# Patient Record
Sex: MALE | Race: WHITE | Employment: OTHER | URBAN - METROPOLITAN AREA
[De-identification: names, ages, dates, MRNs, and addresses within clinical notes are randomized per-mention and may not be internally consistent; named-entity substitution may affect disease eponyms.]

---

## 2020-02-21 ENCOUNTER — THERAPY VISIT (OUTPATIENT)
Dept: PHYSICAL THERAPY | Facility: CLINIC | Age: 50
End: 2020-02-21
Payer: COMMERCIAL

## 2020-02-21 DIAGNOSIS — S46.012A TRAUMATIC INCOMPLETE TEAR OF LEFT ROTATOR CUFF, INITIAL ENCOUNTER: ICD-10-CM

## 2020-02-21 PROCEDURE — 97161 PT EVAL LOW COMPLEX 20 MIN: CPT | Mod: GP

## 2020-02-21 PROCEDURE — 97110 THERAPEUTIC EXERCISES: CPT | Mod: GP
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
## 2020-04-13 ENCOUNTER — VIRTUAL VISIT (OUTPATIENT)
Dept: PHYSICAL THERAPY | Facility: CLINIC | Age: 50
End: 2020-04-13
Payer: COMMERCIAL

## 2020-04-13 DIAGNOSIS — S46.012A TRAUMATIC INCOMPLETE TEAR OF LEFT ROTATOR CUFF, INITIAL ENCOUNTER: ICD-10-CM

## 2020-04-13 PROCEDURE — 97110 THERAPEUTIC EXERCISES: CPT | Mod: 95 | Performed by: PHYSICAL THERAPIST

## 2020-04-13 PROCEDURE — 97112 NEUROMUSCULAR REEDUCATION: CPT | Mod: 95 | Performed by: PHYSICAL THERAPIST

## 2020-04-13 NOTE — PROGRESS NOTES
"Physical Therapy Virtual Follow Up Visit      The patient has been notified of following:     \"This virtual visit will be conducted between you and your provider. We have found that certain health care needs can be provided without the need for physical presence.  This service lets us provide the care you need with a virtual visit.\"    Due to external, as well as internal [facility] management of the COVID-19 Virus, Clarence Salmon was not seen in our clinic.  As a substitution, we implemented a virtual visit to manage this patient's condition utilizing the PTRx virtual visit platform via the patient s existing code.  The provider, Feliz Dennison, reviewed the patient's chart, PTRx prescription, and spoke with the patient to determine the following telemedicine visit is appropriate and effective for the patient's care.    The following type of visit was completed:   Video Visit:  The PTRx platform uses a synchronous HIPAA compliant video stream for this patient encounter.        S: Clarence Salmon is a 50 year old male. Connected virtually on the PTRx platform to discuss their condition/progress. They noted improvements in ROM / painlevel.  They noted ongoing pain or limitations with quick reaching and still limited in ROM.     Current pain level: 4/10      O: Patient demonstrated Shoulder AROM flexion 133 deg, abd 115, ER 90 deg, ext/IR R T7, Left L5, AAROM flexion 170 deg, abd 170 deg, ER 90 deg, ext/IR T9.      PTRx Content from today's visit:  Exercise Name: Reverse Pendulum Supine or Sidelying, Sets: 1 - Reps: 20x - Sessions: 2, Notes: BACK AND SIDE - 4 directions: clockwise, counter clockwise, forward-backward, side-side.  Perform on your back and side.  Keep pain free  Exercise Name: Supine Ceiling Punch, Sets: 1 - Reps: 20 - Sessions: 2  Exercise Name: Shoulder External Rotation Sidelying, Sets: 1 - Reps: 30 - Sessions: 3  Exercise Name: Bent Over Rows, Sets: 1 - Reps: 20 - Sessions: 3, Notes: 15lbs " used in clinic squeeze shoulder blade down and back do not shrug up to your ear.   Exercise Name: Front Pulldowns, Sets: 1 - Reps: 20 - Sessions: 3  Exercise Name: Shoulder Extension in Standing, Sets: 1 - Reps: 20 - Sessions: 3  Exercise Name: Wand Shoulder Extension Standing, Sets: 1 - Reps: 20x - Sessions: 3  Exercise Name: Wand Shoulder Abduction Standing, Sets: 1 - Reps: 20 - Sessions: 3  Exercise Name: Wand Shoulder Flexion in Standing, Sets: 1  - Reps: 20 - Sessions: 3  Exercise Name: Passive Shoulder Flexion Sitting, Sets: 1 - Reps: 20 - Sessions: 3  Exercise Name: Standing Extension, Sets: 1 - Reps: 10 - Sessions: every 2 hoursA:   Patient is progressing as expected.  Response to therapy has shown an improvement in  pain level, ROM  and function      P: Patient will continue with the exercise program assigned on their PTRx code and will add the following measures to manage their pain/condition: progressed to AAROM and scapular strengthening. Plan 1 x/ week for 5 weeks.      Current treatment program is being advanced to more complex exercises.      Virtual visit contact time    Time of service began: 2:50 PM  Time of service ended: 3:35 PM  Total Time for set up, visit, and documentation: 55 minutes    Payor: [facility] / Plan: [facility] [employer] / Product Type: Indemnity /   .  Procedure Code/s   Therapeutic Exercise (80287): 30 minutes  Neuromuscular Re-education (84826): 15 minutes    I have reviewed the note as documented above.  This accurately captures the substance of my conversation with the patient.  Provider location: [facility] ([facility]/State)  Patient location: Pt home address

## 2020-04-17 ENCOUNTER — VIRTUAL VISIT (OUTPATIENT)
Dept: PHYSICAL THERAPY | Facility: CLINIC | Age: 50
End: 2020-04-17
Payer: COMMERCIAL

## 2020-04-17 DIAGNOSIS — S46.012A TRAUMATIC INCOMPLETE TEAR OF LEFT ROTATOR CUFF, INITIAL ENCOUNTER: ICD-10-CM

## 2020-04-17 PROCEDURE — 97110 THERAPEUTIC EXERCISES: CPT | Mod: 95 | Performed by: PHYSICAL THERAPIST

## 2020-04-17 PROCEDURE — 97112 NEUROMUSCULAR REEDUCATION: CPT | Mod: 95 | Performed by: PHYSICAL THERAPIST

## 2020-04-17 NOTE — PROGRESS NOTES
"Physical Therapy Virtual Follow Up Visit      The patient has been notified of following:     \"This virtual visit will be conducted between you and your provider. We have found that certain health care needs can be provided without the need for physical presence.  This service lets us provide the care you need with a virtual visit.\"    Due to external, as well as internal [facility] management of the COVID-19 Virus, Clarence Salmon was not seen in our clinic.  As a substitution, we implemented a virtual visit to manage this patient's condition utilizing the PTRx virtual visit platform via the patient s existing code.  The provider, Feliz Dennison, reviewed the patient's chart, PTRx prescription, and spoke with the patient to determine the following telemedicine visit is appropriate and effective for the patient's care.    The following type of visit was completed:   Video Visit:  The PTRx platform uses a synchronous HIPAA compliant video stream for this patient encounter.        S: Clarence Salmon is a 50 year old male. Connected virtually on the PTRx platform to discuss their condition/progress. They noted improvements in overall feels about the same.  They noted ongoing pain or limitations with Still getting a lot of crunching in his shoulder with movement and gets sharp intermittent pain with random movements.   Has continued with the penedulum, the broom exercises and the rows.   Current pain level: 2/10      O: Patient demonstrated Shoulder flexion pain at 90 deg to 140 deg. AAROM significantly less pain with flexion and no ROM restriction approximately 170 deg, abd 45 deg pain starts and cannot physically go any higher, AAROM abd 180 deg with minimal no pain. External rotation normal no pain, Ext/IR T7 Right, Left L5, ext/IR with assistance T11.    System  Physical Exam  General   ROS  PTRx Content from today's visit:  Exercise Name: Aubrey Shoulder Extension Standing, Sets: 1 - Reps: 20x - Sessions: as " needed  Exercise Name: Wand Shoulder Abduction Standing, Sets: 1 - Reps: 20 - Sessions: as needed  Exercise Name: Wand Shoulder Flexion in Standing, Sets: 1  - Reps: 20 - Sessions: as needed  Exercise Name: Passive Shoulder Flexion Sitting, Sets: 1 - Reps: 20 - Sessions: as needed  Exercise Name: Reverse Pendulum Supine or Sidelying, Sets: 1 - Reps: 20x - Sessions: 2, Notes: BACK AND SIDE - 4 directions: clockwise, counter clockwise, forward-backward, side-side.  Perform on your back and side.  Keep pain free  Exercise Name: Supine Ceiling Punch, Sets: 1 - Reps: 20 - Sessions: 2  Exercise Name: Bent Over Rows, Sets: 2 - Reps: 20 - Sessions: every other day. , Notes: 15lbs used in clinic squeeze shoulder blade down and back do not shrug up to your ear.   Exercise Name: Front Pulldowns, Sets: 2 - Reps: 20 - Sessions: every other day.   Exercise Name: Shoulder External Rotation Sidelying, Sets: 2 - Reps: 20 - Sessions: every other day.   Exercise Name: Shoulder Extension in Standing, Sets: 2 - Reps: 20 - Sessions: every other day .  Exercise Name: Shoulder Theraband Internal Rotation, Sets: 2 - Reps: 20 - Sessions: every other day.   Exercise Name: Shoulder Sidelying Abduction, Sets: 2 - Reps: 20 - Sessions: every other day. , Notes: if sore the following day, take a day of rest.   Exercise Name: Supine Active Shoulder Flexion, Sets: 2 - Reps: 20 - Sessions: every other day. , Notes: if sore the next day, take a day of rest.   Exercise Name: Standing Extension, Sets: 1 - Reps: 10 - Sessions: every 2 hours    A:   Patient is progressing as expected.  Response to therapy has shown an improvement in  ROM  and strength      P: Patient will continue with the exercise program assigned on their PTRx code and will add the following measures to manage their pain/condition: addition of supine RC strengthening for flex/abd and resisted ER/IR strengthening.      Current treatment program is being advanced to more complex  exercises.      Virtual visit contact time    Time of service began: 1:10 PM  Time of service ended: 1:50 PM  Total Time for set up, visit, and documentation: 50 minutes    Payor: DWAYNE / Plan: [employer] [facility] [employer] / Product Type: Indemnity /   .  Procedure Code/s   Therapeutic Exercise (19331): 32 minutes  Neuromuscular Re-education (33049): 8 minutes    I have reviewed the note as documented above.  This accurately captures the substance of my conversation with the patient.  Provider location: [facility] ([facility]/State)  Patient location: Home Address.

## 2020-04-28 ENCOUNTER — VIRTUAL VISIT (OUTPATIENT)
Dept: PHYSICAL THERAPY | Facility: CLINIC | Age: 50
End: 2020-04-28
Payer: COMMERCIAL

## 2020-04-28 DIAGNOSIS — S46.012A TRAUMATIC INCOMPLETE TEAR OF LEFT ROTATOR CUFF, INITIAL ENCOUNTER: ICD-10-CM

## 2020-04-28 PROCEDURE — 97110 THERAPEUTIC EXERCISES: CPT | Mod: 95 | Performed by: PHYSICAL THERAPIST

## 2020-04-28 PROCEDURE — 97112 NEUROMUSCULAR REEDUCATION: CPT | Mod: 95 | Performed by: PHYSICAL THERAPIST

## 2020-04-28 NOTE — PROGRESS NOTES
"Physical Therapy Virtual Follow Up Visit      The patient has been notified of following:     \"This virtual visit will be conducted between you and your provider. We have found that certain health care needs can be provided without the need for physical presence.  This service lets us provide the care you need with a virtual visit.\"    Due to external, as well as internal [facility] management of the COVID-19 Virus, Clarence Salmon was not seen in our clinic.  As a substitution, we implemented a virtual visit to manage this patient's condition utilizing the [employer] virtual visit platform via the patient s existing code.  The provider, Feliz Dennison, reviewed the patient's chart, PTRx prescription, and spoke with the patient to determine the following telemedicine visit is appropriate and effective for the patient's care.    The following type of visit was completed:   Video Visit:  The [employer] platform uses a synchronous HIPAA compliant video stream for this patient encounter.        S: Clarence Salmon is a 50 year old male. Connected virtually on the [employer] platform to discuss their condition/progress. They noted improvements has not lost any strength but really has not seen huge improvements in his shoulder. Now walking up to 4 miles which really seems to help.  They noted ongoing pain or limitations with not having a ton of pain but still getting random intermittent pain at just random time.   Doing HEP 2 x/ day 15-20 reps.   Current pain level: 0/10      O: Patient demonstrated AROM Abd full ROM of 170 deg motion with 4/10 PL at 90 to 120 deg, ER 33 deg no pain just stuck. Ext/IR T6 R, ext/IR / T12 L with significant pull in anterior shoulder. Ext/IR Passively still at T12. PROM flex / abd normal no pain. Flexion strong painfree, Abd strong slight pain, ER weak no pain, IR no pain strong, With wife assisting with posterior translation. Pt ER improved to 60 deg.     PTRx Content from today's visit:  Exercise " Name: Wand Shoulder Extension Standing, Sets: 1 - Reps: 20x - Sessions: as needed  Exercise Name: Wand Shoulder Abduction Standing, Sets: 1 - Reps: 20 - Sessions: as needed  Exercise Name: Wand Shoulder Flexion in Standing, Sets: 1  - Reps: 20 - Sessions: as needed  Exercise Name: Passive Shoulder Flexion Sitting, Sets: 1 - Reps: 20 - Sessions: as needed  Exercise Name: Reverse Pendulum Supine or Sidelying, Sets: 1 - Reps: 20x - Sessions: 2, Notes: BACK AND SIDE - 4 directions: clockwise, counter clockwise, forward-backward, side-side.  Perform on your back and side.  Keep pain free  Exercise Name: Supine Ceiling Punch, Sets: 1 - Reps: 20 - Sessions: 2  Exercise Name: Bent Over Rows, Sets: 2 - Reps: 20 - Sessions: every other day. , Notes: 15lbs used in clinic squeeze shoulder blade down and back do not shrug up to your ear.   Exercise Name: Front Pulldowns, Sets: 2 - Reps: 20 - Sessions: every other day.   Exercise Name: Shoulder External Rotation Sidelying, Sets: 2 - Reps: 20 - Sessions: every other day.   Exercise Name: Shoulder Extension in Standing, Sets: 2 - Reps: 20 - Sessions: every other day .  Exercise Name: Shoulder Theraband Internal Rotation, Sets: 2 - Reps: 20 - Sessions: every other day.   Exercise Name: Shoulder Sidelying Abduction, Sets: 2 - Reps: 20 - Sessions: every other day. , Notes: if sore the following day, take a day of rest.   Exercise Name: Supine Active Shoulder Flexion, Sets: 2 - Reps: 20 - Sessions: every other day. , Notes: if sore the next day, take a day of rest.   Exercise Name: Standing Extension, Sets: 1 - Reps: 10 - Sessions: every 2 hours  Exercise Name: Shoulder Proprioception Milk Jug External Rotation At Side, Sets: 1 - Reps: 30 sec - Sessions: 2  Exercise Name: Bilateral Rotation With Theraband, Sets: 1 - Reps: 20-30 - Sessions: 2  Exercise Name: Scapular Stabilization/Proprioception With A Ball, Sets: 1 - Reps: 30 sec - Sessions: 2    A:   Patient is progressing as  expected.  Response to therapy has shown an improvement in  pain level, ROM  and function      P: Patient will continue with the exercise program assigned on their PTRx code and will add the following measures to manage their pain/condition: addition of propriocetpion and ER/IR stability with milk jug. Avoid ER with theraband and focus on ER with wife provide shoulder stability     Continue current treatment plan until patient demonstrates readiness to progress to higher level exercises.      Virtual visit contact time    Time of service began: 1:50 PM  Time of service ended: 2:43 PM  Total Time for set up, visit, and documentation: 63 minutes    Payor: DWAYNE / Plan: [facility] FARM / Product Type: Indemnity /   .  Procedure Code/s   Therapeutic Exercise (03809): 30 minutes  Neuromuscular Re-education (92098): 10 minutes    I have reviewed the note as documented above.  This accurately captures the substance of my conversation with the patient.  Provider location: [facility] ([facility]/State)  Patient location: home address

## 2020-09-09 PROBLEM — S46.012A TRAUMATIC INCOMPLETE TEAR OF LEFT ROTATOR CUFF: Status: RESOLVED | Noted: 2020-02-21 | Resolved: 2020-09-09

## 2020-09-09 NOTE — PROGRESS NOTES
DISCHARGE SUMMARY    Clarence Salmon was seen 4 times for evaluation and treatment.  Patient did not return for further treatment and current status is unknown.  Due to short treatment duration, no objective or functional changes were made.  Please see goal flow sheet from episode noted date below and initial evaluation for further information.  Patient is discharged from therapy and therapy episode is resolved as of 09/09/20.      Linked Episodes   Type: Episode: Status: Noted: Resolved: Last update: Updated by:   PHYSICAL THERAPY R RCT, impingement  Active 2/21/2020 4/28/2020 12:50 PM Aakash Chaves, PT      Comments: